# Patient Record
(demographics unavailable — no encounter records)

---

## 2025-01-02 NOTE — PHYSICAL EXAM
[General Appearance - In No Acute Distress] : no acute distress [Edema] : no peripheral edema [] : no respiratory distress [Abdomen Soft] : soft [Abdomen Tenderness] : non-tender [Abdomen Mass (___ Cm)] : no abdominal mass palpated [Abdomen Hernia] : no hernia was discovered [Costovertebral Angle Tenderness] : no ~M costovertebral angle tenderness [Urethral Meatus] : meatus normal [Penis Abnormality] : normal circumcised penis [Scrotum] : the scrotum was normal [Epididymis] : the epididymides were normal [Testes Tenderness] : no tenderness of the testes [Testes Mass (___cm)] : there were no testicular masses [Prostate Tenderness] : the prostate was not tender [No Prostate Nodules] : no prostate nodules [Prostate Size ___ gm] : prostate size [unfilled] gm [Normal Station and Gait] : the gait and station were normal for the patient's age [Skin Turgor] : supple [No Focal Deficits] : no focal deficits [Oriented To Time, Place, And Person] : oriented to person, place, and time [Affect] : the affect was normal [Mood] : the mood was normal

## 2025-01-02 NOTE — HISTORY OF PRESENT ILLNESS
[FreeTextEntry1] : 73 YO M seen on 2/25/2020 for PNB for PSA of 12.85 and 4K of 17%. This returned BPH in all cores.   The patient seen 7/28/2020 and reported no problems from the biopsy and came to repeat the 4K score. He tells me that he is voiding well with a good stream on the tamsulosin 0.4 mg he takes daily for his BPH. He denies any stone pain or gross hematuria since his last ESWL performed on 11/2/2019.The patient also is concerned over his bilateral spermatoceles since he has not had a scrotal US in 18 months. He denies any scrotal pain or change in size. He does have chronic ED for over 10 years and tells me that he has had poor response to Viagra and Cialis PRN in the past. He has not tried daily tadalafil yet. I ordered the 4K score and a scrotal US and prescribed tadalafil 5 mg daily for the ED.  Patient seen  3/15/2021 to review recent results. He told me that he had some interval PSA tests with his PCP Alina. These returned 8.5 in 8/2020 and 12 in 11/2020. He has been voiding well on the tamsulosin 0.4 mg. He had the 4K score last week which returned higher at 24% with PSA values of 19.73 (9%) on 3/4/2021. He also had a scrotal US last week at [facility] but the results are not available today.  He has not had a recent urine test for the hematuria.  PAtient tells me that the tamsulosin 5 mg daily helped somewhat but not enough to allow satisfactory intercourse. He denied any side effects. Fish test ordered returned negative on 4.29.2021. Sildenafil 50 mg added PRN to the tadalafil 5 mg daily.  [facility], [facility] MDx pending.   Patient seen 6/30/2021 to reassess his ED. He and wife tell me that he has not been able to get an erection with tadalafil 5 mg daily and sildenafil 50 mg on demand, but upon questioning, they did not try this appropriately. we reviewed again the appropriate way to take the daily tadalafil and PRN sildenafil and I recommended they try this before moving to Duplex US for possible ICI or IPP.   Patient saw Shane for his elevated PSA and 3 negative PNBs, the last one 6/2021. Last PSA was 20.4 and MDX score was 50%. He recommended a course of Levaquin and repeat the PSA and MDX testing ( to be done next week). FU MRI to be done in 5/2023. As for the ED, patient is still not satisfied. Renal US from 12/6/2021 showed stable 4 mm left renal stone.  Patient seen  1/6/2022 to revisit these issues. He has plans to FU with Shane on the PSA after finishing the antibiotic therapy. He has not yet tried the combination of the daily tadalafil 5 mg along with the PRN sildenafil 100 mg, stating that he did not receive the medication I ordered from [facility] for him. He expressed a desire to try this again before proceeding to the Duplex US testing. I reviewed with the patient the appropriate way to take the combination of daily tadalafil 5 milligrams supplemented with additional as needed sildenafil 100 mg. I again ordered the sildenafil through [facility] and I supplied the patient with the name address and telephone number for the pharmacy if they do not contact him directly. We will reassess his response plan with a follow-up visit in 2 months. As for the left renal calculus and microscopic hematuria, we will reassess this at that time. I also reviewed with him his plans to follow-up with Dr. Lynn concerning his elevated PSA and I printed out for him the requisition for the PSA blood test which will be repeated next week. He will then see Dr. Lynn for the exam and MDX test.  PSA 1/12/2022 17.6 stable MDX 70% chance of cancer, 43% chance of HG CaP MRI  from last year PI-RADS =3 5/21/2021, recommended repeat 5/2022  Patient seen 10/21/2022 to repeat PSA and ANNE, check urine, have renal US, order MRI. He reports having right lower abdominal pain, denies flank pain. He continues on the sildenafil 100 mg for his ED. He has not continued the tadalafil 5 mg since he ran out of medication. He requested refills of both.   Renal US: 1.3 cm left midpole stone, 2 mm right lower pole stone, 1.2 cm left simple cysts  UA trace protein, small WBC IPSS: 8 JENNI: 1 CHARLES: 9 The patient tells me that his Crohn's disease is stable off medicine and he remains allergic to Terramycin only. HIs anxiety/depression also remains stable. The patient denies fevers, chills, nausea and or vomiting and no unexplained weight loss. All pertinent parts of the patient PFSH (past medical, family and social histories), laboratory, radiological studies and physician notes were reviewed prior to starting the face to face portion of the telemedicine visit. Questionnaire results were discussed with patient. We discussed his most recent elevated PSA, which has remained stable. ANNE today benign. I recommended that he have a prostate MRI done to further evaluate since he did not have this done in May 2022. A PSA was drawn today and we will call him with that result. Regarding his renal stones I discussed the results of his renal US today, including the increase in size of his left renal stone from 4 mm to 13 mm. He will have a CT stone hunt done to further evaluate. A urine culture and cytology were sent. We made plans to discuss results and proceed as indicated. Tadalafil and sildenafil were renewed at [facility].  PSA 15.2 stable MRI 11/11/2022 PI-RADS=2 CT 11/11/2022 1.5 cm left renal pelvic stone with thickened pelvis, dilated proximal ureter likely due to distal stricture.  Patient seen  12/2/2022 to review results and discuss left ureteroscopy, lasertripsy, possible biopsy, stent placement. He remains asymptomatic. His urinary symptoms remain stable. He has not taken tadalafil and sildenafil for 2-3 weeks due to poor response to his ED. He has also not noted any change in his LUTS. during this time. He denies any flank pain.  UA moderate RBC +2 protein, traced WBC His urinary symptoms remain stable without the tadalafil and I recommend that he can stay this course. We discussed his elevated, stable PSA , and prostate MRI results, PIRADS 2 which does not require any additional intervention at this time. We also discussed his CT scan results which revealed a 1.5 cm stone in his left kidney, and wall thickening in the left renal pelvis and left proximal ureter which will require surgery. We reviewed the procedure of a ureteroscopy, and discussed risks, benefits, and alternatives to this approach. Ureteroscopy will be scheduled within the next month.   Patient underwent cystoscopy aborted ureteroscopy 1/31/2023 due to very large prostate which prevented access to the left ureter. He is doing well, no issues, no bleeding and will restart his Plavix today. I will have him see Conor for treatment of the prostate before we remove the stone. Staff to call to make the appointment.  Patient saw Conor 2/7/2023: 70 year old man with BPH, prostate volume 96 cc on MRI, moderate LUTS currently taking flomax and 1.5 cm left renal pelvis stone. Patient is asymptomatic from the renal stone and is not particularly bothered by his LUTS. He had attempted retrograde ureteroscopy that was aborted due to inability to access the left distal ureter because of prostate size. Dr. Engle referred patient for consideration of prostate debulking procedure. Given no symptoms from stone and minimal bothersome symptoms from BPH, we discussed options for observation, medical management and surgical management. Patient is hesitant to proceed with prostate debulking procedure at this time given minimal bother. We discussed addition of finasteride with hope that the prostate shrinks and allows access to the ureter. We discussed management if stone becomes more symptomatic.  I will discuss patient's case with Dr. Engle in more detail to determine the necessity of stone treatment/ureteroscopy as it relates to a BPH procedure. If Dr. Engle feels stone treatment/ureteroscopy are necessary at this time, I will have further discussion with the patient regarding prostate debulking procedures. In the mean time, patient would like to begin finasteride.  - Begin finasteride 5 mg daily.  PSA with PCP 5/9/2023 10.60 (11%), on 3 months of finasteride.  Patient seen  6/27/2023 to reassess. He continues on combination therapy of tamsulosin 0.4 mg and finasteride. As for his longstanding ED, he would like to resume his daily tadalafil 5 mg and sildenafil 100 mg prn, which he reports good response in the past. As for his left renal stone, he continues to be asymptomatic. IPSS 8 PVR 13 cc We discussed his stable LUTS while on the combination therapy, and he will continue with this. In view of his stable LUTS, we will hold off on prostatic reductive surgery at this time. As for his history of renal stones, he remains asymptomatic and we will reassess stone burden with a renal sonogram at his next visit. Regarding his erectile dysfunction, he will resume on the daily tadalafil 5 mg and sildenafil 100 mg prn, as these medications have worked well for him in the past. All medications were renewed at [facility] per patient's request. If there are no new problems, he will follow up in 2 months for a renal and pelvic sonogram to be done at that time, for prostate evaluation after 5 months of finasteride. We will repeat the PSA at that time and assess with consideration for the 6 month effect of finasteride.  . Patient seen 8/3/2023 to reassess PSA (on finasteride 6 months), renal US and Pelvic US. Mildly increased LUTS, with nocturia x3. He continues on the combination therapy tamsulosin 0.4 mg and finasteride. He was previously on daily tadalafil 5 mg for ED but has not been taking it. He is requesting a new PCP and needs syringes for his B12 injections that he has been taking for over 20 years. IPSS 7 JENNI 0 CHARLES 10 renal sono: left midpole stone 1.2 cm, new 3 mm left midpole stone, no stones on right, no hydro bilaterally pelvic sono: prevoid 26 cc, 96 cc prostate median lobe 1.3 cm. PSA 10.10 (equivalent to 20.2 with finasteride effect).  Patient seen 10/26/2023 to reassess the PSA and due to recent onset of LEFT renal colic. He tells me that the left sided pain has been dull and intermittent. He denies any gross hematuria or dysuria. He has had renal colic in the past and is requesting narcotic pain medication in case this recurs. UA large RBC PVR 6 ml Renal US:  Right kidney: 10.5 cm x 5.0 cm x 4.6 cm Cortical Thickness: 1.3 cm UP 1.3 cm LP  Left kidney: 11.3 cm x 5.7 cm x 5.2 cm Cortical Thickness: 1.1 cm UP 1.1 cm LP Echogenicity: Normal  Bladder: PVR: 6 cc. Unable to visualize bilateral ureteral jets due to empty bladder. Prostate vol: 113 cc Prostate measurement: 6.9 cm x 5.7 cm x 5.5 cm  Impressions: Right Kidney- There is a right lateral mid pole simple cyst measuring 1.3 cm x 1.0 cm. Left Kidney- There is mild fullness of the left renal pelvis with an echogenic focus measuring 1.8 cm x 2.0 cm with posterior shadowing and twinkle artifact. There is an echogenic focus in the left mid pole measuring 3.2 mm with twinkle artifact and an echogenic focus in the left lower pole measuring 6.9 mm with posterior shadowing. There is a simple cyst in the left upper pole measuring 1.4 cm x 1.2 cm. Both kidneys are normal in size and echogenicity without solid masses present. Limited view of pelvis due to empty bladder. We discussed findings today on renal ultrasound which demonstrates an increase in the left stone volume and number with some indication of fullness in the left kidney without significant hydronephrosis. I recommended that we proceed with CT scan of the abdomen and pelvis and that he then see my partner Dr. Hossein Sadler for evaluation for appropriate removal of the stones, especially in view of his 100 g prostate gland. Because of his previous renal colic and fear of such, I did prescribe for him with 3 Percocet pills to use in case of an emergency. This is a one-time only prescription. CT scan ordered and referral made to Dr. Sadler after we discussed the indications risks alternatives and chances for success with this approach. Tamsulosin and finasteride also renewed. PSA sent (on finasteride).  Phoned patient, PSA 4.54 (equivalent to 9.08 due to finasteride), this is improved. C+S 50- 99K Gm positive organism on preliminary.  11/1/2023: CT scan shows partially obstructing 2 cm left renal pelvic stone. Patient scheduled to see Rafaela next week to discuss treatment options, especially in view of his large prostate gland.  12/4/2023 Patient underwent left attempted PCNL by Rafaela, but was not completed due to anatomical issues. Instead had antegrade placement of left JJ stent.  1.15.2024: Patient underwent left ureteroscopy, laser lithotripsy by Rafaela.  Patient seen 2/22/2024 to reassess. He is asymptomatic from the lasertripsy, but notes increased urgency and stable nocturia x 2, no dysuria. UA negative IPSS 7  Renal US: Right kidney: 9.9 cm x 5.0 cm x 5.0 cm Cortical Thickness: 1.1 cm UP 1.0 cm LP Left kidney: 11.1 cm x 5.8 cm x 4.9 cm Cortical Thickness: 1.3 cm UP 1.0 cm LP Echogenicity: Normal Prostate measurement: 7.1 cm x 5.7 cm x 5.5 cm Impressions: Right Kidney-There is an echogenic focus in the right lower pole measuring 4.1 mm with twinkle artifact. There is a simple cyst in the right lateral mid pole measuring 1.0 cm x 1.0 cm. Left Kidney-There are two echogenic foci in the left mid pole measuring 5.3 mm and 3.7 mm, both with faint posterior shadowing and twinkle artifact. Both kidneys are normal in size and echogenicity without hydronephrosis or solid masses present. Pelvic US: Bladder: PVR: 21 cc Prostate vol: 117 cc Patient remains asymptomatic from his stone disease with possible bilateral small stones by renal ultrasound today. However, previously noted ureteral obstruction and left hydronephrosis are completely gone today. As for his enlarged prostate gland, he does have mild symptoms mostly of some urgency and nocturia x 2 while on finasteride, tamsulosin 0.4 mg, tadalafil 5 mg daily. I recommended that we obtain a KUB x-ray now and this was ordered. He will plan to follow-up with Dr. Sadler later this week. If KUB demonstrates minimal and acceptable stone disease, then follow-up with me in 6 months. As for his enlarged prostate gland, we discussed increasing the tamsulosin to 0.8 mg daily at this time, however with a normal residual bladder volume of 21 mL, he is not likely to experience much improvement in his urination. We also discussed evaluation for treatment to decrease the prostate volume such as HoLEP procedure with Dr. Gonzales, however the patient would like to defer this and I agree at the present point since his symptoms are tolerable. Until then we will maintain him on the 3 medications as he has been on and he has enough ordered to last until his next visit. Blood sent for PSA (on finasteride). PSA 7.93 (=15.86 on finasteride) up from 4.54 (9.08) last time. Results to patient by , recommend repeat in 1-2 months.   PSA 3.91 (20%) (=7.82 on finasteride) 7/22/2024, down from 7.93 (=15.86 on finasteride) on 2/22/2024. Results to patient by phone, FU as planned.   Patient seen TODAY 1/2/2025 to reassess. He told me that his LUTS are better on triple drug treatment of tamsulosin 0.4mlg, tadalafil 5 mg and finasteride 5 mg all daily and he requests refills of all 3 from [facility]. He actually ran out of medicine, including the finasteride 10 days ago. He denies any flank pain or gross hematuria, but did not get the KUB X-ray as recommended in February. UA trace RBC IPSS 4 CHARLES 10 JENNI 1 PELVIC US: PVR 34ml, Prostate 117gm.

## 2025-01-02 NOTE — PHYSICAL EXAM
Problem:  CARE  Goal: Vital signs are medically acceptable  2020 by Angie Rock RN  Outcome: Met This Shift  2020 by Addy Yang RN  Outcome: Ongoing  Goal: Thermoregulation maintained greater than 97/less than 99.4 Ax  2020 by Angie Rock RN  Outcome: Met This Shift  2020 by Addy Yang RN  Outcome: Ongoing  Goal: Infant exhibits minimal/reduced signs of pain/discomfort  2020 by Angie Rock RN  Outcome: Met This Shift  2020 by Addy Yang RN  Outcome: Met This Shift  Goal: Infant is maintained in safe environment  2020 by Angie Rock RN  Outcome: Met This Shift  2020 by Addy Yang RN  Outcome: Met This Shift  Goal: Baby is with Mother and family  2020 by Angie Rock RN  Outcome: Met This Shift  2020 by Addy Yang RN  Outcome: Met This Shift    Communication issues -  needed. [General Appearance - In No Acute Distress] : no acute distress [Edema] : no peripheral edema [] : no respiratory distress [Abdomen Soft] : soft [Abdomen Tenderness] : non-tender [Abdomen Mass (___ Cm)] : no abdominal mass palpated [Abdomen Hernia] : no hernia was discovered [Costovertebral Angle Tenderness] : no ~M costovertebral angle tenderness [Urethral Meatus] : meatus normal [Penis Abnormality] : normal circumcised penis [Scrotum] : the scrotum was normal [Epididymis] : the epididymides were normal [Testes Tenderness] : no tenderness of the testes [Testes Mass (___cm)] : there were no testicular masses [Prostate Tenderness] : the prostate was not tender [No Prostate Nodules] : no prostate nodules [Prostate Size ___ gm] : prostate size [unfilled] gm [Normal Station and Gait] : the gait and station were normal for the patient's age [Skin Turgor] : supple [No Focal Deficits] : no focal deficits [Oriented To Time, Place, And Person] : oriented to person, place, and time [Affect] : the affect was normal [Mood] : the mood was normal

## 2025-01-02 NOTE — ASSESSMENT
[FreeTextEntry1] : Evaluation today is consistent with stable minimal symptoms from his previously diagnosed left kidney stone.  I recommended that he proceed to KUB x-ray now and this was ordered.  He was given the requisition.  He will follow-up with Dr. Sadler moving forward since I will be retiring in June July 1, 2025.  As for his marked BPH, he continues to have minimal lower urinary tract symptoms and a normal PVR of 34 mL today.  I recommend he continue on the tadalafil 5 mg tamsulosin 0.4 mg finasteride 5 mg daily and these were ordered.  PSA was also sent today and will be adjusted for his finasteride usage.  If Ms. Story notes stone disease remains stable, then he will be due for routine follow-up of his prostate gland in 1 year otherwise as noted above he will contact Dr. Sadler concerning the left renal stone identified on previous renal US. Becky Engle MD

## 2025-01-02 NOTE — HISTORY OF PRESENT ILLNESS
[FreeTextEntry1] : 73 YO M seen on 2/25/2020 for PNB for PSA of 12.85 and 4K of 17%. This returned BPH in all cores.   The patient seen 7/28/2020 and reported no problems from the biopsy and came to repeat the 4K score. He tells me that he is voiding well with a good stream on the tamsulosin 0.4 mg he takes daily for his BPH. He denies any stone pain or gross hematuria since his last ESWL performed on 11/2/2019.The patient also is concerned over his bilateral spermatoceles since he has not had a scrotal US in 18 months. He denies any scrotal pain or change in size. He does have chronic ED for over 10 years and tells me that he has had poor response to Viagra and Cialis PRN in the past. He has not tried daily tadalafil yet. I ordered the 4K score and a scrotal US and prescribed tadalafil 5 mg daily for the ED.  Patient seen  3/15/2021 to review recent results. He told me that he had some interval PSA tests with his PCP Alina. These returned 8.5 in 8/2020 and 12 in 11/2020. He has been voiding well on the tamsulosin 0.4 mg. He had the 4K score last week which returned higher at 24% with PSA values of 19.73 (9%) on 3/4/2021. He also had a scrotal US last week at [facility] but the results are not available today.  He has not had a recent urine test for the hematuria.  PAtient tells me that the tamsulosin 5 mg daily helped somewhat but not enough to allow satisfactory intercourse. He denied any side effects. Fish test ordered returned negative on 4.29.2021. Sildenafil 50 mg added PRN to the tadalafil 5 mg daily.  [facility], [facility] MDx pending.   Patient seen 6/30/2021 to reassess his ED. He and wife tell me that he has not been able to get an erection with tadalafil 5 mg daily and sildenafil 50 mg on demand, but upon questioning, they did not try this appropriately. we reviewed again the appropriate way to take the daily tadalafil and PRN sildenafil and I recommended they try this before moving to Duplex US for possible ICI or IPP.   Patient saw Shane for his elevated PSA and 3 negative PNBs, the last one 6/2021. Last PSA was 20.4 and MDX score was 50%. He recommended a course of Levaquin and repeat the PSA and MDX testing ( to be done next week). FU MRI to be done in 5/2023. As for the ED, patient is still not satisfied. Renal US from 12/6/2021 showed stable 4 mm left renal stone.  Patient seen  1/6/2022 to revisit these issues. He has plans to FU with Shane on the PSA after finishing the antibiotic therapy. He has not yet tried the combination of the daily tadalafil 5 mg along with the PRN sildenafil 100 mg, stating that he did not receive the medication I ordered from [facility] for him. He expressed a desire to try this again before proceeding to the Duplex US testing. I reviewed with the patient the appropriate way to take the combination of daily tadalafil 5 milligrams supplemented with additional as needed sildenafil 100 mg. I again ordered the sildenafil through [facility] and I supplied the patient with the name address and telephone number for the pharmacy if they do not contact him directly. We will reassess his response plan with a follow-up visit in 2 months. As for the left renal calculus and microscopic hematuria, we will reassess this at that time. I also reviewed with him his plans to follow-up with Dr. Lynn concerning his elevated PSA and I printed out for him the requisition for the PSA blood test which will be repeated next week. He will then see Dr. Lynn for the exam and MDX test.  PSA 1/12/2022 17.6 stable MDX 70% chance of cancer, 43% chance of HG CaP MRI  from last year PI-RADS =3 5/21/2021, recommended repeat 5/2022  Patient seen 10/21/2022 to repeat PSA and ANNE, check urine, have renal US, order MRI. He reports having right lower abdominal pain, denies flank pain. He continues on the sildenafil 100 mg for his ED. He has not continued the tadalafil 5 mg since he ran out of medication. He requested refills of both.   Renal US: 1.3 cm left midpole stone, 2 mm right lower pole stone, 1.2 cm left simple cysts  UA trace protein, small WBC IPSS: 8 JENNI: 1 CHARLES: 9 The patient tells me that his Crohn's disease is stable off medicine and he remains allergic to Terramycin only. HIs anxiety/depression also remains stable. The patient denies fevers, chills, nausea and or vomiting and no unexplained weight loss. All pertinent parts of the patient PFSH (past medical, family and social histories), laboratory, radiological studies and physician notes were reviewed prior to starting the face to face portion of the telemedicine visit. Questionnaire results were discussed with patient. We discussed his most recent elevated PSA, which has remained stable. ANNE today benign. I recommended that he have a prostate MRI done to further evaluate since he did not have this done in May 2022. A PSA was drawn today and we will call him with that result. Regarding his renal stones I discussed the results of his renal US today, including the increase in size of his left renal stone from 4 mm to 13 mm. He will have a CT stone hunt done to further evaluate. A urine culture and cytology were sent. We made plans to discuss results and proceed as indicated. Tadalafil and sildenafil were renewed at [facility].  PSA 15.2 stable MRI 11/11/2022 PI-RADS=2 CT 11/11/2022 1.5 cm left renal pelvic stone with thickened pelvis, dilated proximal ureter likely due to distal stricture.  Patient seen  12/2/2022 to review results and discuss left ureteroscopy, lasertripsy, possible biopsy, stent placement. He remains asymptomatic. His urinary symptoms remain stable. He has not taken tadalafil and sildenafil for 2-3 weeks due to poor response to his ED. He has also not noted any change in his LUTS. during this time. He denies any flank pain.  UA moderate RBC +2 protein, traced WBC His urinary symptoms remain stable without the tadalafil and I recommend that he can stay this course. We discussed his elevated, stable PSA , and prostate MRI results, PIRADS 2 which does not require any additional intervention at this time. We also discussed his CT scan results which revealed a 1.5 cm stone in his left kidney, and wall thickening in the left renal pelvis and left proximal ureter which will require surgery. We reviewed the procedure of a ureteroscopy, and discussed risks, benefits, and alternatives to this approach. Ureteroscopy will be scheduled within the next month.   Patient underwent cystoscopy aborted ureteroscopy 1/31/2023 due to very large prostate which prevented access to the left ureter. He is doing well, no issues, no bleeding and will restart his Plavix today. I will have him see Conor for treatment of the prostate before we remove the stone. Staff to call to make the appointment.  Patient saw Conor 2/7/2023: 70 year old man with BPH, prostate volume 96 cc on MRI, moderate LUTS currently taking flomax and 1.5 cm left renal pelvis stone. Patient is asymptomatic from the renal stone and is not particularly bothered by his LUTS. He had attempted retrograde ureteroscopy that was aborted due to inability to access the left distal ureter because of prostate size. Dr. Engle referred patient for consideration of prostate debulking procedure. Given no symptoms from stone and minimal bothersome symptoms from BPH, we discussed options for observation, medical management and surgical management. Patient is hesitant to proceed with prostate debulking procedure at this time given minimal bother. We discussed addition of finasteride with hope that the prostate shrinks and allows access to the ureter. We discussed management if stone becomes more symptomatic.  I will discuss patient's case with Dr. Engle in more detail to determine the necessity of stone treatment/ureteroscopy as it relates to a BPH procedure. If Dr. Engle feels stone treatment/ureteroscopy are necessary at this time, I will have further discussion with the patient regarding prostate debulking procedures. In the mean time, patient would like to begin finasteride.  - Begin finasteride 5 mg daily.  PSA with PCP 5/9/2023 10.60 (11%), on 3 months of finasteride.  Patient seen  6/27/2023 to reassess. He continues on combination therapy of tamsulosin 0.4 mg and finasteride. As for his longstanding ED, he would like to resume his daily tadalafil 5 mg and sildenafil 100 mg prn, which he reports good response in the past. As for his left renal stone, he continues to be asymptomatic. IPSS 8 PVR 13 cc We discussed his stable LUTS while on the combination therapy, and he will continue with this. In view of his stable LUTS, we will hold off on prostatic reductive surgery at this time. As for his history of renal stones, he remains asymptomatic and we will reassess stone burden with a renal sonogram at his next visit. Regarding his erectile dysfunction, he will resume on the daily tadalafil 5 mg and sildenafil 100 mg prn, as these medications have worked well for him in the past. All medications were renewed at [facility] per patient's request. If there are no new problems, he will follow up in 2 months for a renal and pelvic sonogram to be done at that time, for prostate evaluation after 5 months of finasteride. We will repeat the PSA at that time and assess with consideration for the 6 month effect of finasteride.  . Patient seen 8/3/2023 to reassess PSA (on finasteride 6 months), renal US and Pelvic US. Mildly increased LUTS, with nocturia x3. He continues on the combination therapy tamsulosin 0.4 mg and finasteride. He was previously on daily tadalafil 5 mg for ED but has not been taking it. He is requesting a new PCP and needs syringes for his B12 injections that he has been taking for over 20 years. IPSS 7 JENNI 0 CHARLES 10 renal sono: left midpole stone 1.2 cm, new 3 mm left midpole stone, no stones on right, no hydro bilaterally pelvic sono: prevoid 26 cc, 96 cc prostate median lobe 1.3 cm. PSA 10.10 (equivalent to 20.2 with finasteride effect).  Patient seen 10/26/2023 to reassess the PSA and due to recent onset of LEFT renal colic. He tells me that the left sided pain has been dull and intermittent. He denies any gross hematuria or dysuria. He has had renal colic in the past and is requesting narcotic pain medication in case this recurs. UA large RBC PVR 6 ml Renal US:  Right kidney: 10.5 cm x 5.0 cm x 4.6 cm Cortical Thickness: 1.3 cm UP 1.3 cm LP  Left kidney: 11.3 cm x 5.7 cm x 5.2 cm Cortical Thickness: 1.1 cm UP 1.1 cm LP Echogenicity: Normal  Bladder: PVR: 6 cc. Unable to visualize bilateral ureteral jets due to empty bladder. Prostate vol: 113 cc Prostate measurement: 6.9 cm x 5.7 cm x 5.5 cm  Impressions: Right Kidney- There is a right lateral mid pole simple cyst measuring 1.3 cm x 1.0 cm. Left Kidney- There is mild fullness of the left renal pelvis with an echogenic focus measuring 1.8 cm x 2.0 cm with posterior shadowing and twinkle artifact. There is an echogenic focus in the left mid pole measuring 3.2 mm with twinkle artifact and an echogenic focus in the left lower pole measuring 6.9 mm with posterior shadowing. There is a simple cyst in the left upper pole measuring 1.4 cm x 1.2 cm. Both kidneys are normal in size and echogenicity without solid masses present. Limited view of pelvis due to empty bladder. We discussed findings today on renal ultrasound which demonstrates an increase in the left stone volume and number with some indication of fullness in the left kidney without significant hydronephrosis. I recommended that we proceed with CT scan of the abdomen and pelvis and that he then see my partner Dr. Hossein Sadler for evaluation for appropriate removal of the stones, especially in view of his 100 g prostate gland. Because of his previous renal colic and fear of such, I did prescribe for him with 3 Percocet pills to use in case of an emergency. This is a one-time only prescription. CT scan ordered and referral made to Dr. Sadler after we discussed the indications risks alternatives and chances for success with this approach. Tamsulosin and finasteride also renewed. PSA sent (on finasteride).  Phoned patient, PSA 4.54 (equivalent to 9.08 due to finasteride), this is improved. C+S 50- 99K Gm positive organism on preliminary.  11/1/2023: CT scan shows partially obstructing 2 cm left renal pelvic stone. Patient scheduled to see Rafaela next week to discuss treatment options, especially in view of his large prostate gland.  12/4/2023 Patient underwent left attempted PCNL by Rafaela, but was not completed due to anatomical issues. Instead had antegrade placement of left JJ stent.  1.15.2024: Patient underwent left ureteroscopy, laser lithotripsy by Rafaela.  Patient seen 2/22/2024 to reassess. He is asymptomatic from the lasertripsy, but notes increased urgency and stable nocturia x 2, no dysuria. UA negative IPSS 7  Renal US: Right kidney: 9.9 cm x 5.0 cm x 5.0 cm Cortical Thickness: 1.1 cm UP 1.0 cm LP Left kidney: 11.1 cm x 5.8 cm x 4.9 cm Cortical Thickness: 1.3 cm UP 1.0 cm LP Echogenicity: Normal Prostate measurement: 7.1 cm x 5.7 cm x 5.5 cm Impressions: Right Kidney-There is an echogenic focus in the right lower pole measuring 4.1 mm with twinkle artifact. There is a simple cyst in the right lateral mid pole measuring 1.0 cm x 1.0 cm. Left Kidney-There are two echogenic foci in the left mid pole measuring 5.3 mm and 3.7 mm, both with faint posterior shadowing and twinkle artifact. Both kidneys are normal in size and echogenicity without hydronephrosis or solid masses present. Pelvic US: Bladder: PVR: 21 cc Prostate vol: 117 cc Patient remains asymptomatic from his stone disease with possible bilateral small stones by renal ultrasound today. However, previously noted ureteral obstruction and left hydronephrosis are completely gone today. As for his enlarged prostate gland, he does have mild symptoms mostly of some urgency and nocturia x 2 while on finasteride, tamsulosin 0.4 mg, tadalafil 5 mg daily. I recommended that we obtain a KUB x-ray now and this was ordered. He will plan to follow-up with Dr. Sadler later this week. If KUB demonstrates minimal and acceptable stone disease, then follow-up with me in 6 months. As for his enlarged prostate gland, we discussed increasing the tamsulosin to 0.8 mg daily at this time, however with a normal residual bladder volume of 21 mL, he is not likely to experience much improvement in his urination. We also discussed evaluation for treatment to decrease the prostate volume such as HoLEP procedure with Dr. Gonzales, however the patient would like to defer this and I agree at the present point since his symptoms are tolerable. Until then we will maintain him on the 3 medications as he has been on and he has enough ordered to last until his next visit. Blood sent for PSA (on finasteride). PSA 7.93 (=15.86 on finasteride) up from 4.54 (9.08) last time. Results to patient by , recommend repeat in 1-2 months.   PSA 3.91 (20%) (=7.82 on finasteride) 7/22/2024, down from 7.93 (=15.86 on finasteride) on 2/22/2024. Results to patient by phone, FU as planned.   Patient seen TODAY 1/2/2025 to reassess. He told me that his LUTS are better on triple drug treatment of tamsulosin 0.4mlg, tadalafil 5 mg and finasteride 5 mg all daily and he requests refills of all 3 from [facility]. He actually ran out of medicine, including the finasteride 10 days ago. He denies any flank pain or gross hematuria, but did not get the KUB X-ray as recommended in February. UA trace RBC IPSS 4 CHARLES 10 JENNI 1 PELVIC US: PVR 34ml, Prostate 117gm.

## 2025-01-02 NOTE — LETTER BODY
[Dear  ___] : Dear ~ANDREW, [Courtesy Letter:] : I had the pleasure of seeing your patient, [unfilled], in my office today. [Please see my note below.] : Please see my note below. [Consult Closing:] : Thank you very much for allowing me to participate in the care of this patient.  If you have any questions, please do not hesitate to contact me. [FreeTextEntry2] : Shyam Bowie MD [FreeTextEntry3] : Best personal regards,  Becky

## 2025-01-07 NOTE — ASSESSMENT
[FreeTextEntry1] : ======================================================================================= 1. Dyslipidemia: LDL 28 (08/27/24):   - continue atorvastatin 40mg po qd  - discussed therapeutic lifestyle changes to promote improved lipid metabolism  - check lab work today  2. HTN: BP at ACC/AHA 2017 guideline target:  - continue amlodipine 5mg po qd  3. CVA: ESUS, small right posterior corona radiata and right basal ganglia CVAs 11/15/19:  - continue long-term single antiplatelet therapy with clopidogrel 75mg po qd  - follow up with neurologist, Jackelyn Guerrero MD at [facility]  4. OBINNA: mild:  - will pursue conservative management  5. Lower extremity edema: secondary to chronic venous insufficiency:   - will re-initiate furosemide 40mg po qd prn  - d/w patient importance of maintaining a weight log, limiting excessive dietary sodium, and using prn loop diuretics

## 2025-01-07 NOTE — HISTORY OF PRESENT ILLNESS
[FreeTextEntry1] : Mr. Reyes presents for follow up and management of CVA (11/15/19), BPH, Crohn's disease, uveitis, dyslipidemia, nephrolithiasis, and OBINNA.  On 11/15/2019 he presented to [facility] with diplopia and was found to have a right mid brain acute and small right corona radiata (slightly older appearing) CVA on MRI.  After a basic work up, he was diagnosed with an embolic stroke of undetermined source (ESUS) and treated with clopidogrel. He is following with his neurologist, Jackelyn Guerrero MD at [facility].   He was evaluated by a cardiologist, Robby Fuller MD at that time but is not able to provide any details of his evaluation.  On 01/31/23, he had an exercise stress echocardiogram which revealed an EF of 68 and normal wall motion, PA pressures, and ECG post 7.0 METs of exercise.  He has had two urologic procedures to try to address renal calculi.  He is currently in a legal battel with [facility] and [employer] to be reinstated as a member.  On 08/27/24, he had an echocardiogram which revealed an EF of 64%, mild LVH, aortic sclerosis, and mild AI.  At present, his edema has improved but is still significant.  he admits to not having used the prn loop diuretic.

## 2025-01-07 NOTE — REASON FOR VISIT
[Other: ____] : [unfilled] [FreeTextEntry1] : ======================================================================================= Diagnostic Tests: -------------------------------------------------------------- EC24: sinus rhythm, possible old AWMI.  24: sinus rhythm, normal ECG.  23: sinus rhythm, normal ECG.  23: sinus rhythm, PRWP.   -------------------------------------------------------------- Sleep studies: 17: mild OBINNA.  -------------------------------------------------------------- CT: 22: CTA: lPCA moderate stenosis at P1-P2 junction, no significant extracranial carotid stenosis.  -------------------------------------------------------------- MRI: 22: head: chronic small right posterior corona radiata and right basal ganglia CVAs.   19: MRA head/neck: no significant carotid artery atherosclerosis.  -------------------------------------------------------------- Stress tests: 23: exercise stress echo: EF 68, normal wall motion, PA pressures, and ECG post 7.0 METs of exercise.  -------------------------------------------------------------- Echo: 24: EF 64%, mild LVH, aortic sclerosis, mild AI.

## 2025-05-06 NOTE — HISTORY OF PRESENT ILLNESS
[FreeTextEntry1] : Name HUSSEIN AGUIAR MRN [id number]  Aug 12 1952 ------------------------------------------------------------------------------------------------------------------------------------------- Date of First Visit: 2023 Referring Provider/PCP: Dr. Becky Engle ------------------------------------------------------------------------------------------------------------------------------------------- CC: kidney stones  History of Present Illness: HUSSEIN AGIUAR is a 71-year-old male with PMH of stroke in 2019 (on Plavix - Neurologist Jackelyn Brown), BPH, HTN, HLD, Crohn's disease (s/p bowel resection in ) here for evaluation for kidney stones. Has had known stones for several years. Attempted URS/LL in 2023 but stone was not treated. He experienced left flank pain on 10/26, but has otherwise been asymptomatic. CT on 10/30 demonstrated a 1.4 x 0.9 cm stone in the left renal pelvis. He is here to discuss treatment options, and would prefer to not attempt another ureteroscopy again.  Imaging: CT scan from 10/30/2023 can be found in [facility] PACS. Findings: There are several tiny non-obstructing stones in the collecting system of the right kidney. Right kidney demonstrates no hydronephrosis. Left kidney demonstrates mild hydronephrosis. There is a 1.4 x 0.9 cm stone in the left renal pelvis. There is infiltration of the fat surrounding the left renal pelvis and proximal left ureter. Several additional tiny nonobstructing stones visualized in the collecting system of the left kidney.  Previous urine cultures: 10/26/2023: 50,000 - 99,000 CFU/mL Staphylococcus haemolyticus 2023: <10,000 CFU/mL Normal Urogenital Harmony  Kidney Stone History: First-time stone former - Yes Concurrent asymptomatic stone(s) - Yes Previous stone surgeries - No Previous passed stones - No Comorbidities - Crohn's disease Family history of kidney stones - Unknown Previous metabolic evaluation - No ------------------------------------------------------------------------------------------------------------------------------------------- Interval History (2024): S/p Right ureteroscopy/laser lithotripsy for large renal stone. US yesterday in Dr. Engle's office showed two echogenic foci in midpole without shadowing - 3 and 5 mm. KUB was ordered and performed today - I independently reviewed the patient's KUB and do not see any obvious stones but will await final radiologist read. States he is bothered by ED that is unresponsive to oral medical therapy. ------------------------------------------------------------------------------------------------------------------------------------------- Interval History (2025): follows up ahead of schedule with new bothersome left lower back pain. Plans on traveling tomorrow morning. Last US more than 1 year ago notable for 2 right sided echogenic foci (3 and 5 mm). US performed in the office: no hydronephrosis bilaterally; small echogenic foci seen bilaterally.

## 2025-05-06 NOTE — ASSESSMENT
[FreeTextEntry1] : Assessment: HUSSEIN AGUIAR is a 73 y/o M with urolithiasis, BPH, elevated PSA (on finasteride), and ED. New flank pain/lower back pain, likely musculoskeletal. Stable small foci on US without hydro.   Plan: -Toradol 10 mg q6h prn for severe pain - take with food; risks discussed -follow up 1 year US

## 2025-05-29 NOTE — REASON FOR VISIT
[Other: ____] : [unfilled] [FreeTextEntry1] : ======================================================================================= Diagnostic Tests: -------------------------------------------------------------- EC25: sinus rhythm, normal ECG.  24: sinus rhythm, possible old AWMI.  24: sinus rhythm, normal ECG.  23: sinus rhythm, normal ECG.  23: sinus rhythm, PRWP.   -------------------------------------------------------------- Sleep studies: 17: mild OBINNA.  -------------------------------------------------------------- CT: 22: CTA: lPCA moderate stenosis at P1-P2 junction, no significant extracranial carotid stenosis.  -------------------------------------------------------------- MRI: 22: head: chronic small right posterior corona radiata and right basal ganglia CVAs.   19: MRA head/neck: no significant carotid artery atherosclerosis.  -------------------------------------------------------------- Stress tests: 23: exercise stress echo: EF 68, normal wall motion, PA pressures, and ECG post 7.0 METs of exercise.  -------------------------------------------------------------- Echo: 24: EF 64%, mild LVH, aortic sclerosis, mild AI.

## 2025-05-29 NOTE — HISTORY OF PRESENT ILLNESS
[FreeTextEntry1] : Mr. Reyes presents for follow up and management of CVA (11/15/19), BPH, Crohn's disease, uveitis, dyslipidemia, nephrolithiasis, and OBINNA.  On 11/15/2019 he presented to [facility] with diplopia and was found to have a right mid brain acute and small right corona radiata (slightly older appearing) CVA on MRI.  After a basic work up, he was diagnosed with an embolic stroke of undetermined source (ESUS) and treated with clopidogrel. He is following with his neurologist, Jackelyn Guerrero MD at [facility].   He was evaluated by a cardiologist, Robby Fuller MD at that time but is not able to provide any details of his evaluation.  On 01/31/23, he had an exercise stress echocardiogram which revealed an EF of 68 and normal wall motion, PA pressures, and ECG post 7.0 METs of exercise.  He has had two urologic procedures to try to address renal calculi.  He is currently in a legal battel with [facility] and [employer] to be reinstated as a member.  On 08/27/24, he had an echocardiogram which revealed an EF of 64%, mild LVH, aortic sclerosis, and mild AI.  At present, his edema has improved but is still significant.  He admits to not having used the prn loop diuretic.

## 2025-05-29 NOTE — ASSESSMENT
[FreeTextEntry1] : ======================================================================================= 1. Dyslipidemia: LDL 28 (08/27/24):   - continue atorvastatin 40mg po qd  - discussed therapeutic lifestyle changes to promote improved lipid metabolism  - check lab work today  2. HTN: BP at ACC/AHA 2017 guideline target:  - continue amlodipine 5mg po qd  3. CVA: ESUS, small right posterior corona radiata and right basal ganglia CVAs 11/15/19:  - continue long-term single antiplatelet therapy with clopidogrel 75mg po qd  - follow up with neurologist, Jackelyn Guerrero MD at [facility]  4. OBINNA: mild:  - will pursue conservative management  5. Lower extremity edema: secondary to chronic venous insufficiency:   - will [id number] furosemide 40mg po qd prn  - d/w patient importance of maintaining a weight log, limiting excessive dietary sodium, and using prn loop diuretics    The patient was seen and examined with a student as part of their educational experience.  Permission was obtained at the time of the visit from the patient for the student's participation.

## 2025-05-29 NOTE — HISTORY OF PRESENT ILLNESS
[FreeTextEntry1] : Mr. Reyes presents for follow up and management of CVA (11/15/19), BPH, Crohn's disease, uveitis, dyslipidemia, nephrolithiasis, and OBINNA.  On 11/15/2019 he presented to [facility] with diplopia and was found to have a right mid brain acute and small right corona radiata (slightly older appearing) CVA on MRI.  After a basic work up, he was diagnosed with an embolic stroke of undetermined source (ESUS) and treated with clopidogrel. He is following with his neurologist, Jackelyn Guerrero MD at [facility].   He was evaluated by a cardiologist, Robby Fuller MD at that time but is not able to provide any details of his evaluation.  On 01/31/23, he had an exercise stress echocardiogram which revealed an EF of 68 and normal wall motion, PA pressures, and ECG post 7.0 METs of exercise.  He has had two urologic procedures to try to address renal calculi.  He is currently in a legal battel with [facility] and [employer] to be reinstated as a member.  On 08/27/24, he had an echocardiogram which revealed an EF of 64%, mild LVH, aortic sclerosis, and mild AI.  At present, his edema has improved but is still significant.  He admits to not having used the prn loop diuretic.

## 2025-05-29 NOTE — ASSESSMENT
[FreeTextEntry1] : ======================================================================================= 1. Dyslipidemia: LDL 28 (08/27/24):   - continue atorvastatin 40mg po qd  - discussed therapeutic lifestyle changes to promote improved lipid metabolism  - check lab work today  2. HTN: BP at ACC/AHA 2017 guideline target:  - continue amlodipine 5mg po qd  3. CVA: ESUS, small right posterior corona radiata and right basal ganglia CVAs 11/15/19:  - continue long-term single antiplatelet therapy with clopidogrel 75mg po qd  - follow up with neurologist, Jackelyn Guerrero MD at [facility]  4. OBINNA: mild:  - will pursue conservative management  5. Lower extremity edema: secondary to chronic venous insufficiency:   - will [id number] furosemide 40mg po qd prn  - d/w patient importance of maintaining a weight log, limiting excessive dietary sodium, and using prn loop diuretics    The patient was seen and examined with a student as part of their educational experience.  Permission was obtained at the time of the visit from the patient for the student's participation.

## 2025-06-02 NOTE — ASSESSMENT
[FreeTextEntry1] : ======================================================================================= 1. Dyslipidemia: lipoprotein a < 9 nmol/liter (05/29/25), LDL 22 (05/29/25):   - continue atorvastatin 40mg po qd  - discussed therapeutic lifestyle changes to promote improved lipid metabolism  - check lab work today  2. HTN: BP at ACC/AHA 2017 guideline target:  - continue amlodipine 5mg po qd  3. CVA: ESUS, small right posterior corona radiata and right basal ganglia CVAs 11/15/19:  - continue long-term single antiplatelet therapy with clopidogrel 75mg po qd  - follow up with neurologist, Jackelyn Guerrero MD at [facility]  4. OBINNA: mild:  - will pursue conservative management  5. Lower extremity edema: secondary to chronic venous insufficiency:   - will [id number]-[id number] furosemide 40mg po qd prn  - d/w patient importance of maintaining a weight log, limiting excessive dietary sodium, and using prn loop diuretics    The patient was seen and examined with a student as part of their educational experience.  Permission was obtained at the time of the visit from the patient for the student's participation.

## 2025-06-02 NOTE — HISTORY OF PRESENT ILLNESS
[FreeTextEntry1] : Mr. Reyes presents for follow up and management of CVA (11/15/19), BPH, Crohn's disease, uveitis, dyslipidemia, nephrolithiasis, and OBINNA.  On 11/15/2019 he presented to [facility] with diplopia and was found to have a right mid brain acute and small right corona radiata (slightly older appearing) CVA on MRI.  After a basic work up, he was diagnosed with an embolic stroke of undetermined source (ESUS) and treated with clopidogrel. He is following with his neurologist, Jackelyn Guerrero MD at [facility].   He was evaluated by a cardiologist, Robby Fuller MD at that time but is not able to provide any details of his evaluation.  On 01/31/23, he had an exercise stress echocardiogram which revealed an EF of 68 and normal wall motion, PA pressures, and ECG post 7.0 METs of exercise.  He has had two urologic procedures to try to address renal calculi.  He is currently in a legal battel with [facility] and [employer] to be reinstated as a member.  On 08/27/24, he had an echocardiogram which revealed an EF of 64%, mild LVH, aortic sclerosis, and mild AI.  At present, his edema has improved but is still significant.  He admits to not having used the prn loop diuretic.

## 2025-06-05 NOTE — PROCEDURE
[de-identified] : Indication: requirement for exam not possible via anterior rhinoscopy; chronic nasal obstruction After verbal consent and the administration of an aerosolized oxymetazoline/lidocaine mix, examination was performed with a flexible endoscope attached to a video monitoring system. 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

## 2025-06-05 NOTE — CONSULT LETTER
[Dear  ___] : Dear  [unfilled], [Consult Letter:] : I had the pleasure of evaluating your patient, [unfilled]. [Please see my note below.] : Please see my note below. [Consult Closing:] : Thank you very much for allowing me to participate in the care of this patient.  If you have any questions, please do not hesitate to contact me. [Sincerely,] : Sincerely, [FreeTextEntry3] : SHANTA Youngblood Jr, MD, FAAOHNS Otolaryngologist [facility]

## 2025-06-05 NOTE — HISTORY OF PRESENT ILLNESS
[de-identified] : His wife has been complaining about his hearing and he's turning the TV up quite a bit. No tinnitus. Denies: ear pain, drainage, frequent loud noise exp/shooting, frequent infections, hx of ear surgery or IV antibiotics/chemo; denies a FHx of hearing loss. Qtip use: no On questioning, the external appearance of his nose may be changing over time; he's unaware of a large septal perforation. He denies discolored rhinorrhea, facial pressure or nasal dyspnea. No cocaine since the 80s.  CVAs x 2 in 2019 and is on Plavix.

## 2025-06-05 NOTE — DATA REVIEWED
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

## 2025-06-05 NOTE — PHYSICAL EXAM
[FreeTextEntry8] : cerumen & hair removed w/ suction & forceps [FreeTextEntry9] : cerumen & hair removed w/ suction & forceps [de-identified] : as noted; large septal perf [de-identified] : prominent bony area L upper dorsum & collapsed L nostril [FreeTextEntry2] : as noted